# Patient Record
Sex: FEMALE | Employment: STUDENT | URBAN - METROPOLITAN AREA
[De-identification: names, ages, dates, MRNs, and addresses within clinical notes are randomized per-mention and may not be internally consistent; named-entity substitution may affect disease eponyms.]

---
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
## 2024-10-03 NOTE — TELEPHONE ENCOUNTER
LMTCB [Normal] : General appearance: No acute distress, well appearing and well nourished [Fully active, able to carry on all pre-disease performance without restriction] : Status 0 - Fully active, able to carry on all pre-disease performance without restriction

## (undated) NOTE — LETTER
Jessica Owens [facility]  [address] [facility] [place] [address]       06/21/18        Patient: Kristi Painter   YOB: 2000   Date of Visit: 6/21/2018       Dear  Dr. Ese Hoang MD,      Thank you for referring Kristi Painter to my